# Patient Record
(demographics unavailable — no encounter records)

---

## 2025-04-03 NOTE — REVIEW OF SYSTEMS
[Cough] : cough [Chest Tightness] : chest tightness [Dyspnea] : dyspnea [Wheezing] : wheezing [SOB on Exertion] : sob on exertion [Negative] : Endocrine [TextBox_30] : below on exertion only

## 2025-04-03 NOTE — HISTORY OF PRESENT ILLNESS
[TextBox_4] : 21 yo F with no significant PMHx presents with SOBOE associated with chest tightness, wheezing worsened in the past year, patient describes near fainting sensation and need to catch her breath following the treadmill, or just recently running [place] flight [place] the ferry.   Patient was seen by Cardiologist 8 months ago, has unremarkable EKG and TTE performed.   FHx notable for brother with asthma Senior in college studying nursing never smoker lives with family  no pets